# Patient Record
Sex: FEMALE | Race: WHITE | NOT HISPANIC OR LATINO | Employment: FULL TIME | URBAN - NONMETROPOLITAN AREA
[De-identification: names, ages, dates, MRNs, and addresses within clinical notes are randomized per-mention and may not be internally consistent; named-entity substitution may affect disease eponyms.]

---

## 2023-09-28 PROBLEM — K58.9 IRRITABLE BOWEL SYNDROME (IBS): Status: ACTIVE | Noted: 2023-09-28

## 2023-09-28 PROBLEM — R63.6 UNDERWEIGHT: Status: ACTIVE | Noted: 2023-09-28

## 2023-09-28 PROBLEM — M54.50 LOW BACK PAIN: Status: ACTIVE | Noted: 2023-09-28

## 2023-09-28 RX ORDER — MULTIVITAMIN WITH IRON
TABLET ORAL
COMMUNITY
Start: 2018-08-06

## 2023-09-28 RX ORDER — EPINEPHRINE 0.3 MG/.3ML
INJECTION, SOLUTION INTRAMUSCULAR
COMMUNITY
Start: 2018-08-31

## 2023-09-29 ENCOUNTER — OFFICE VISIT (OUTPATIENT)
Dept: PRIMARY CARE | Facility: CLINIC | Age: 23
End: 2023-09-29
Payer: COMMERCIAL

## 2023-09-29 VITALS
DIASTOLIC BLOOD PRESSURE: 57 MMHG | TEMPERATURE: 98.9 F | OXYGEN SATURATION: 98 % | BODY MASS INDEX: 17.7 KG/M2 | HEART RATE: 90 BPM | WEIGHT: 103.7 LBS | SYSTOLIC BLOOD PRESSURE: 92 MMHG | HEIGHT: 64 IN

## 2023-09-29 DIAGNOSIS — Z91.018 NUT ALLERGY: ICD-10-CM

## 2023-09-29 DIAGNOSIS — Z00.00 PHYSICAL EXAM, ANNUAL: Primary | ICD-10-CM

## 2023-09-29 PROBLEM — R63.6 UNDERWEIGHT: Status: RESOLVED | Noted: 2023-09-28 | Resolved: 2023-09-29

## 2023-09-29 PROBLEM — K58.9 IRRITABLE BOWEL SYNDROME (IBS): Status: RESOLVED | Noted: 2023-09-28 | Resolved: 2023-09-29

## 2023-09-29 PROCEDURE — 1036F TOBACCO NON-USER: CPT | Performed by: FAMILY MEDICINE

## 2023-09-29 PROCEDURE — 99395 PREV VISIT EST AGE 18-39: CPT | Performed by: FAMILY MEDICINE

## 2023-09-29 ASSESSMENT — PATIENT HEALTH QUESTIONNAIRE - PHQ9
1. LITTLE INTEREST OR PLEASURE IN DOING THINGS: NOT AT ALL
SUM OF ALL RESPONSES TO PHQ9 QUESTIONS 1 AND 2: 0
2. FEELING DOWN, DEPRESSED OR HOPELESS: NOT AT ALL

## 2023-09-29 ASSESSMENT — ENCOUNTER SYMPTOMS
UNEXPECTED WEIGHT CHANGE: 0
LIGHT-HEADEDNESS: 0
BRUISES/BLEEDS EASILY: 0
HEADACHES: 0
ADENOPATHY: 0
COUGH: 0
PALPITATIONS: 0
DYSURIA: 0
HEMATURIA: 0
FEVER: 0
SHORTNESS OF BREATH: 0

## 2023-09-29 ASSESSMENT — PROMIS GLOBAL HEALTH SCALE
RATE_QUALITY_OF_LIFE: VERY GOOD
RATE_MENTAL_HEALTH: VERY GOOD
RATE_AVERAGE_FATIGUE: MILD
RATE_AVERAGE_PAIN: 0
RATE_PHYSICAL_HEALTH: VERY GOOD
CARRYOUT_SOCIAL_ACTIVITIES: VERY GOOD
RATE_SOCIAL_SATISFACTION: VERY GOOD
RATE_GENERAL_HEALTH: VERY GOOD
CARRYOUT_PHYSICAL_ACTIVITIES: COMPLETELY
EMOTIONAL_PROBLEMS: RARELY

## 2023-09-29 NOTE — PROGRESS NOTES
" Subjective   Patient ID: Jane Duff is a 23 y.o. female who presents for Annual Exam, Flu Vaccine (Pt declines flu vaccine today ), and Auvi-Q (Discuss Auvi-Q. Pt would like 2 cartons. ).  HPI    Pt here for wellness .  Last CPE 1 year ago .       Interval Health :  good;  diet changes -  Dairy and soy and gluten and added sugar   This helps w gi sxs, andgave her more energy . Gained 4 lbs   Saw chiropracter and her back issues are good / under control .     Interval Changes in PMHx. PSHx, FMHx :  reviewed   PSHx, FMHx, Problem / Med/ Allergy reviewed.       Concerns/Questions: prescription for Auvi Q . Paper form needs filled out , yearly and we send it in  .       Review of Systems   Constitutional:  Negative for fever and unexpected weight change.   HENT:  Negative for dental problem.    Eyes:  Negative for visual disturbance.   Respiratory:  Negative for cough and shortness of breath.    Cardiovascular:  Negative for chest pain and palpitations.   Genitourinary:  Negative for dysuria and hematuria.   Skin:  Negative for rash.   Neurological:  Negative for syncope, light-headedness and headaches.   Hematological:  Negative for adenopathy. Does not bruise/bleed easily.     Soc  Work going well, got 2 promotions and that reduced her work stress.   No tob  No etoh .   Menses regular   Objective   BP 92/57 (BP Location: Left arm, Patient Position: Sitting, BP Cuff Size: Adult)   Pulse 90   Temp 37.2 °C (98.9 °F) (Temporal)   Ht 1.626 m (5' 4\")   Wt 47 kg (103 lb 11.2 oz)   LMP 08/28/2023   SpO2 98%   BMI 17.80 kg/m²     Physical Exam  Vitals and nursing note reviewed.   Constitutional:       General: She is not in acute distress.     Appearance: Normal appearance.   HENT:      Right Ear: Tympanic membrane normal.      Left Ear: Tympanic membrane normal.   Cardiovascular:      Rate and Rhythm: Normal rate and regular rhythm.      Heart sounds: Normal heart sounds.   Pulmonary:      Breath sounds: Normal " breath sounds.   Abdominal:      General: Bowel sounds are normal.      Palpations: Abdomen is soft.   Musculoskeletal:         General: Normal range of motion.      Cervical back: Neck supple.   Neurological:      General: No focal deficit present.      Mental Status: She is alert and oriented to person, place, and time.     Assessment/Plan   Problem List Items Addressed This Visit    None  Visit Diagnoses       Physical exam, annual    -  Primary    Relevant Orders    Follow Up In [facility] - PCP - Health Maintenance    Nut allergy        Relevant Orders    Follow Up In [facility] - PCP - Health Maintenance               Wellness  - preventive care and health maintenance reviewed and discussed   In excellent health. Continue healthy lifestyle.    Allergy, Nut  - will complete the paperwork for Gomez Ansari MD

## 2024-10-02 ENCOUNTER — APPOINTMENT (OUTPATIENT)
Dept: PRIMARY CARE | Facility: CLINIC | Age: 24
End: 2024-10-02
Payer: COMMERCIAL